# Patient Record
Sex: FEMALE | Race: WHITE | Employment: OTHER | URBAN - METROPOLITAN AREA
[De-identification: names, ages, dates, MRNs, and addresses within clinical notes are randomized per-mention and may not be internally consistent; named-entity substitution may affect disease eponyms.]

---

## 2017-01-11 ENCOUNTER — OFFICE VISIT (OUTPATIENT)
Dept: FAMILY MEDICINE CLINIC | Facility: CLINIC | Age: 77
End: 2017-01-11

## 2017-01-11 VITALS
BODY MASS INDEX: 27 KG/M2 | WEIGHT: 151 LBS | DIASTOLIC BLOOD PRESSURE: 71 MMHG | RESPIRATION RATE: 18 BRPM | SYSTOLIC BLOOD PRESSURE: 126 MMHG | TEMPERATURE: 98 F | HEART RATE: 59 BPM

## 2017-01-11 DIAGNOSIS — F17.200 SMOKING ADDICTION: ICD-10-CM

## 2017-01-11 DIAGNOSIS — K52.838 OTHER MICROSCOPIC COLITIS: Primary | ICD-10-CM

## 2017-01-11 DIAGNOSIS — M48.00 SPINAL STENOSIS, UNSPECIFIED SPINAL REGION: ICD-10-CM

## 2017-01-11 DIAGNOSIS — I10 ESSENTIAL HYPERTENSION: ICD-10-CM

## 2017-01-11 DIAGNOSIS — M79.672 LEFT FOOT PAIN: ICD-10-CM

## 2017-01-11 PROCEDURE — 99213 OFFICE O/P EST LOW 20 MIN: CPT | Performed by: FAMILY MEDICINE

## 2017-01-11 PROCEDURE — G0463 HOSPITAL OUTPT CLINIC VISIT: HCPCS | Performed by: FAMILY MEDICINE

## 2017-01-11 RX ORDER — IRBESARTAN 300 MG/1
TABLET ORAL
Qty: 90 TABLET | Refills: 1 | Status: SHIPPED | OUTPATIENT
Start: 2017-01-11 | End: 2017-07-20

## 2017-01-11 RX ORDER — NIFEDIPINE 60 MG/1
TABLET, FILM COATED, EXTENDED RELEASE ORAL
Qty: 90 TABLET | Refills: 1 | Status: SHIPPED | OUTPATIENT
Start: 2017-01-11 | End: 2017-08-01

## 2017-01-11 RX ORDER — BUDESONIDE 3 MG/1
3 CAPSULE, COATED PELLETS ORAL EVERY MORNING
Qty: 90 CAPSULE | Refills: 0 | Status: SHIPPED | OUTPATIENT
Start: 2017-01-11 | End: 2017-04-23

## 2017-01-11 RX ORDER — SIMVASTATIN 40 MG
20 TABLET ORAL NIGHTLY
Qty: 90 TABLET | Refills: 1 | Status: SHIPPED | OUTPATIENT
Start: 2017-01-11 | End: 2017-07-31

## 2017-01-11 RX ORDER — TRAMADOL HYDROCHLORIDE 50 MG/1
TABLET ORAL
Qty: 120 TABLET | Refills: 0 | Status: SHIPPED
Start: 2017-01-11 | End: 2017-04-24

## 2017-01-11 RX ORDER — TIZANIDINE 2 MG/1
2 TABLET ORAL NIGHTLY
Qty: 90 TABLET | Refills: 1 | Status: SHIPPED | OUTPATIENT
Start: 2017-01-11 | End: 2019-08-12

## 2017-01-11 RX ORDER — OMEPRAZOLE 20 MG/1
CAPSULE, DELAYED RELEASE ORAL
Qty: 90 CAPSULE | Refills: 1 | Status: SHIPPED | OUTPATIENT
Start: 2017-01-11 | End: 2017-07-08

## 2017-01-11 RX ORDER — CHLORTHALIDONE 25 MG/1
TABLET ORAL
Qty: 90 TABLET | Refills: 1 | Status: SHIPPED | OUTPATIENT
Start: 2017-01-11 | End: 2017-07-08

## 2017-01-11 NOTE — PROGRESS NOTES
HPI:  Oj Corcoran is a 77year old female who presents for follow-up visit. Transferring over from [facility] has a history of microscopic colitis. Pt was told she was due to take it for one year and then can stop.   States she is no longer having the proble prior to visit. Tobacco Use: yes     ROS: see HPI    Objective:   Gen: AOx3. NAD.    /71 mmHg  Pulse 59  Temp(Src) 97.9 °F (36.6 °C) (Oral)  Resp 18  Wt 151 lb (68.493 kg)   CV:  Regular rate and rhythm; no murmurs  Lungs:  Clear to ausculation; goo

## 2017-01-23 ENCOUNTER — OFFICE VISIT (OUTPATIENT)
Dept: PODIATRY CLINIC | Facility: CLINIC | Age: 77
End: 2017-01-23

## 2017-01-23 DIAGNOSIS — M79.675 PAIN OF TOE OF LEFT FOOT: Primary | ICD-10-CM

## 2017-01-23 DIAGNOSIS — M20.42 HAMMER TOE OF LEFT FOOT: ICD-10-CM

## 2017-01-23 PROCEDURE — 99203 OFFICE O/P NEW LOW 30 MIN: CPT | Performed by: PODIATRIST

## 2017-01-23 PROCEDURE — G0463 HOSPITAL OUTPT CLINIC VISIT: HCPCS | Performed by: PODIATRIST

## 2017-01-23 RX ORDER — OMEGA-3 FATTY ACIDS/FISH OIL 300-1000MG
CAPSULE ORAL
COMMUNITY
End: 2019-08-12

## 2017-01-23 RX ORDER — MULTIVIT WITH MINERALS/LUTEIN
1000 TABLET ORAL DAILY
COMMUNITY
End: 2019-08-12

## 2017-01-23 RX ORDER — ACETAMINOPHEN 160 MG
2000 TABLET,DISINTEGRATING ORAL DAILY
COMMUNITY
End: 2019-08-12

## 2017-01-23 NOTE — PROGRESS NOTES
HPI:    Patient ID: Rut Newell is a 77year old female. HPI  This pleasant 78-year-old female presents as a new patient to me on referral from [address] [place] [address]. Patient is accompanied today by her .   Her chief complaint is pain associated with the Allergies:  Seasonal                   PHYSICAL EXAM:   Physical Exam  On physical exam it is quite obvious in reference to her area of pain.   The lesser toes on this left foot are semi-rigidly contracted with dorsal keratoses over the proximal interph

## 2017-04-24 RX ORDER — TRAMADOL HYDROCHLORIDE 50 MG/1
TABLET ORAL
Qty: 120 TABLET | Refills: 0 | Status: SHIPPED | OUTPATIENT
Start: 2017-04-24 | End: 2017-08-01

## 2017-04-24 RX ORDER — BUDESONIDE 3 MG/1
CAPSULE, COATED PELLETS ORAL
Qty: 90 CAPSULE | Refills: 0 | Status: SHIPPED | OUTPATIENT
Start: 2017-04-24 | End: 2017-07-24

## 2017-04-24 NOTE — TELEPHONE ENCOUNTER
PLEASE ADVISE ON REFILL. THANKS.   Recent Visits       Provider Department Primary Dx    3 months ago Dany Wild MD [facility], [facility], [address], [facility] Other microscopic colitis    5 months ago Dany Wild MD [facility], [facility], [address],

## 2017-04-24 NOTE — TELEPHONE ENCOUNTER
Requesting Tramadol refill    Last filled 1/11/17  Last OV 1/11/17    Med pended for review, please advise

## 2017-05-31 ENCOUNTER — OFFICE VISIT (OUTPATIENT)
Dept: FAMILY MEDICINE CLINIC | Facility: CLINIC | Age: 77
End: 2017-05-31

## 2017-05-31 VITALS
RESPIRATION RATE: 18 BRPM | WEIGHT: 149 LBS | BODY MASS INDEX: 26 KG/M2 | SYSTOLIC BLOOD PRESSURE: 190 MMHG | HEART RATE: 62 BPM | DIASTOLIC BLOOD PRESSURE: 83 MMHG | TEMPERATURE: 98 F

## 2017-05-31 DIAGNOSIS — M79.672 LEFT FOOT PAIN: ICD-10-CM

## 2017-05-31 DIAGNOSIS — L02.31 CUTANEOUS ABSCESS OF BUTTOCK: ICD-10-CM

## 2017-05-31 DIAGNOSIS — K62.5 RECTAL BLEEDING: ICD-10-CM

## 2017-05-31 DIAGNOSIS — K52.838 OTHER MICROSCOPIC COLITIS: Primary | ICD-10-CM

## 2017-05-31 PROCEDURE — 10060 I&D ABSCESS SIMPLE/SINGLE: CPT | Performed by: FAMILY MEDICINE

## 2017-05-31 PROCEDURE — G0463 HOSPITAL OUTPT CLINIC VISIT: HCPCS | Performed by: FAMILY MEDICINE

## 2017-05-31 PROCEDURE — 99214 OFFICE O/P EST MOD 30 MIN: CPT | Performed by: FAMILY MEDICINE

## 2017-05-31 RX ORDER — CLINDAMYCIN HYDROCHLORIDE 300 MG/1
300 CAPSULE ORAL 3 TIMES DAILY
Qty: 21 CAPSULE | Refills: 0 | Status: SHIPPED | OUTPATIENT
Start: 2017-05-31 | End: 2017-06-07

## 2017-05-31 NOTE — PROGRESS NOTES
HPI: Aung Garcia is a 77year old female who presents for bright red blood after bowel movements for the past week. Pt states she has trouble with bowel habits. She goes between constipation and diarrhea. Bleeding is new.  Started to get worse after difficult by mouth nightly. Disp: 90 tablet Rfl: 1   Irbesartan 300 MG Oral Tab TK 1 T PO QD Disp: 90 tablet Rfl: 1     No current facility-administered medications on file prior to visit. Tobacco Use: no    Objective:   Gen: AOx3. In discomfort.    /79 mmHg

## 2017-06-03 ENCOUNTER — OFFICE VISIT (OUTPATIENT)
Dept: FAMILY MEDICINE CLINIC | Facility: CLINIC | Age: 77
End: 2017-06-03

## 2017-06-03 VITALS
DIASTOLIC BLOOD PRESSURE: 77 MMHG | WEIGHT: 147.81 LBS | HEIGHT: 63 IN | HEART RATE: 57 BPM | RESPIRATION RATE: 17 BRPM | BODY MASS INDEX: 26.19 KG/M2 | SYSTOLIC BLOOD PRESSURE: 153 MMHG | TEMPERATURE: 98 F

## 2017-06-03 DIAGNOSIS — L02.31 ABSCESS, GLUTEAL, RIGHT: Primary | ICD-10-CM

## 2017-06-03 PROCEDURE — 99213 OFFICE O/P EST LOW 20 MIN: CPT | Performed by: FAMILY MEDICINE

## 2017-06-03 PROCEDURE — G0463 HOSPITAL OUTPT CLINIC VISIT: HCPCS | Performed by: FAMILY MEDICINE

## 2017-06-03 NOTE — PROGRESS NOTES
HPI: Lary Richter is a 77year old female who presents for follow-up right gluteal abscess. Packing fell out on Thursday night. Pain has improved. Taking Clindamycin. Not draining any longer. No fever.      PMH:    Past Medical History   Diagnosis Date   • E m)  Wt 147 lb 12.8 oz (67.042 kg)  BMI 26.19 kg/m2  Right gluteal abscess- improved. Erythema improved. Incision closed. Decreased induration. Mildly tender to palpation.       Assessment:/Plan:  Abscess, gluteal, right  (primary encounter diagnosis)

## 2017-06-06 ENCOUNTER — OFFICE VISIT (OUTPATIENT)
Dept: PODIATRY CLINIC | Facility: CLINIC | Age: 77
End: 2017-06-06

## 2017-06-06 DIAGNOSIS — M20.42 HAMMER TOE OF LEFT FOOT: ICD-10-CM

## 2017-06-06 DIAGNOSIS — M79.675 PAIN OF TOE OF LEFT FOOT: Primary | ICD-10-CM

## 2017-06-06 PROCEDURE — G0463 HOSPITAL OUTPT CLINIC VISIT: HCPCS | Performed by: PODIATRIST

## 2017-06-06 PROCEDURE — 99212 OFFICE O/P EST SF 10 MIN: CPT | Performed by: PODIATRIST

## 2017-06-07 NOTE — PROGRESS NOTES
HPI:    Patient ID: Noemí Gallego is a 77year old female. HPI  This 45-year-old female presents to me today with a chief complaint of pain associated with the fifth left toe. Patient states that she cannot even wear shoe on this toe today.   The pain excisional debridement of the area demonstrates an area of chronic nucleation. Upon debridement I anticipate immediate and complete relief but as well based on digital deformity I would expect recurrence.   I again spent time discussing the etiology, progr

## 2017-07-13 RX ORDER — OMEPRAZOLE 20 MG/1
CAPSULE, DELAYED RELEASE ORAL
Qty: 90 CAPSULE | Refills: 0 | Status: SHIPPED | OUTPATIENT
Start: 2017-07-13 | End: 2017-10-11

## 2017-07-13 RX ORDER — CHLORTHALIDONE 25 MG/1
TABLET ORAL
Qty: 90 TABLET | Refills: 0 | Status: SHIPPED | OUTPATIENT
Start: 2017-07-13 | End: 2017-10-13

## 2017-07-13 NOTE — TELEPHONE ENCOUNTER
Hypertensive Medications  Protocol Criteria:  · Appointment scheduled in the past 6 months or in the next 3 months  · BMP or CMP in the past 12 months  · Creatinine result < 2  Recent Outpatient Visits            1 month ago Pain of toe of left foot    [place]

## 2017-07-24 ENCOUNTER — TELEPHONE (OUTPATIENT)
Dept: FAMILY MEDICINE CLINIC | Facility: CLINIC | Age: 77
End: 2017-07-24

## 2017-07-24 RX ORDER — BUDESONIDE 3 MG/1
CAPSULE, COATED PELLETS ORAL
Qty: 90 CAPSULE | Refills: 0 | Status: SHIPPED | OUTPATIENT
Start: 2017-07-24 | End: 2017-12-20

## 2017-07-24 RX ORDER — IRBESARTAN 300 MG/1
TABLET ORAL
Qty: 90 TABLET | Refills: 0 | Status: SHIPPED | OUTPATIENT
Start: 2017-07-24 | End: 2017-10-25

## 2017-07-24 NOTE — TELEPHONE ENCOUNTER
Signed Prescriptions Disp Refills    IRBESARTAN 300 MG Oral Tab 90 tablet 0      Sig: TAKE 1 TABLET BY MOUTH EVERY DAY        Authorizing Provider: Marla Bolaños        Ordering User: Mamadou Donato           Refill approved per protocol.

## 2017-07-24 NOTE — TELEPHONE ENCOUNTER
Refill Protocol Appointment Criteria  · Appointment scheduled in the past 6 months or in the next 3 months  Recent Outpatient Visits            1 month ago Pain of toe of left foot    [facility], [address], [place], [place] [place]

## 2017-07-24 NOTE — TELEPHONE ENCOUNTER
Pt walk in OPO this am, she thought her appt with Dr Levar Barry was today, but it's next Monday July 31,2017, she stated she only have 2 days left of her medication budesonide 3mg she stated she only need 1 month, and was also asking about her 3 month refill

## 2017-07-27 ENCOUNTER — OFFICE VISIT (OUTPATIENT)
Dept: GASTROENTEROLOGY | Facility: CLINIC | Age: 77
End: 2017-07-27

## 2017-07-27 VITALS
HEART RATE: 59 BPM | DIASTOLIC BLOOD PRESSURE: 73 MMHG | WEIGHT: 149 LBS | SYSTOLIC BLOOD PRESSURE: 163 MMHG | HEIGHT: 62 IN | BODY MASS INDEX: 27.42 KG/M2

## 2017-07-27 DIAGNOSIS — K52.9 CHRONIC DIARRHEA: Primary | ICD-10-CM

## 2017-07-27 DIAGNOSIS — K52.839 MICROSCOPIC COLITIS, UNSPECIFIED MICROSCOPIC COLITIS TYPE: ICD-10-CM

## 2017-07-27 PROCEDURE — 99214 OFFICE O/P EST MOD 30 MIN: CPT | Performed by: INTERNAL MEDICINE

## 2017-07-27 PROCEDURE — G0463 HOSPITAL OUTPT CLINIC VISIT: HCPCS | Performed by: INTERNAL MEDICINE

## 2017-07-27 NOTE — H&P
[address] [facility] [address] Gastroenterology                                                                                                  Clinic History and Physical     Pa @ [address]  2015: EGD      Comment: @ [facility]   No date: TUBAL LIGATION   Family Hx:   Family History   Problem Relation Age of Onset   • Heart Disorder Father 79     MI   • Cancer Father 39     stomach   • Other [OTHER] Father      emphysema   • Other Thuy Rape diplopia   RESPIRATORY:  negative for severe shortness of breath  CARDIOVASCULAR:  negative for crushing sub-sternal chest pain  GASTROINTESTINAL:  see HPI  GENITOURINARY:  negative for dysuria or gross hematuria  INTEGUMENT/BREAST:  SKIN:  negative for ja she may have significant flare of her microscopic colitis. Therefore I would suggest that she try to add Imodium and bismuth in the form of Pepto-Bismol along with budesonide to see if the budesonide can be taken to every other day.   Will review records t

## 2017-07-28 ENCOUNTER — TELEPHONE (OUTPATIENT)
Dept: GASTROENTEROLOGY | Facility: CLINIC | Age: 77
End: 2017-07-28

## 2017-07-29 ENCOUNTER — LAB ENCOUNTER (OUTPATIENT)
Dept: LAB | Age: 77
End: 2017-07-29
Attending: INTERNAL MEDICINE
Payer: MEDICARE

## 2017-07-29 DIAGNOSIS — K52.9 CHRONIC DIARRHEA: ICD-10-CM

## 2017-07-29 LAB
ALBUMIN SERPL BCP-MCNC: 3.8 G/DL (ref 3.5–4.8)
ALBUMIN/GLOB SERPL: 1.3 {RATIO} (ref 1–2)
ALP SERPL-CCNC: 84 U/L (ref 32–100)
ALT SERPL-CCNC: 21 U/L (ref 14–54)
ANION GAP SERPL CALC-SCNC: 9 MMOL/L (ref 0–18)
AST SERPL-CCNC: 27 U/L (ref 15–41)
BASOPHILS # BLD: 0 K/UL (ref 0–0.2)
BASOPHILS NFR BLD: 1 %
BILIRUB SERPL-MCNC: 0.8 MG/DL (ref 0.3–1.2)
BUN SERPL-MCNC: 28 MG/DL (ref 8–20)
BUN/CREAT SERPL: 22.6 (ref 10–20)
CALCIUM SERPL-MCNC: 9.2 MG/DL (ref 8.5–10.5)
CHLORIDE SERPL-SCNC: 103 MMOL/L (ref 95–110)
CO2 SERPL-SCNC: 24 MMOL/L (ref 22–32)
CORTIS SERPL-MCNC: 18 MCG/DL
CREAT SERPL-MCNC: 1.24 MG/DL (ref 0.5–1.5)
EOSINOPHIL # BLD: 0.1 K/UL (ref 0–0.7)
EOSINOPHIL NFR BLD: 2 %
ERYTHROCYTE [DISTWIDTH] IN BLOOD BY AUTOMATED COUNT: 14.9 % (ref 11–15)
GLOBULIN PLAS-MCNC: 3 G/DL (ref 2.5–3.7)
GLUCOSE SERPL-MCNC: 87 MG/DL (ref 70–99)
HCT VFR BLD AUTO: 41.4 % (ref 35–48)
HGB BLD-MCNC: 13.8 G/DL (ref 12–16)
LYMPHOCYTES # BLD: 0.9 K/UL (ref 1–4)
LYMPHOCYTES NFR BLD: 16 %
MAGNESIUM SERPL-MCNC: 1.8 MG/DL (ref 1.8–2.5)
MCH RBC QN AUTO: 30.5 PG (ref 27–32)
MCHC RBC AUTO-ENTMCNC: 33.3 G/DL (ref 32–37)
MCV RBC AUTO: 91.4 FL (ref 80–100)
MONOCYTES # BLD: 0.5 K/UL (ref 0–1)
MONOCYTES NFR BLD: 9 %
NEUTROPHILS # BLD AUTO: 4 K/UL (ref 1.8–7.7)
NEUTROPHILS NFR BLD: 71 %
OSMOLALITY UR CALC.SUM OF ELEC: 287 MOSM/KG (ref 275–295)
PLATELET # BLD AUTO: 175 K/UL (ref 140–400)
PMV BLD AUTO: 8.7 FL (ref 7.4–10.3)
POTASSIUM SERPL-SCNC: 3.8 MMOL/L (ref 3.3–5.1)
PROT SERPL-MCNC: 6.8 G/DL (ref 5.9–8.4)
RBC # BLD AUTO: 4.53 M/UL (ref 3.7–5.4)
SODIUM SERPL-SCNC: 136 MMOL/L (ref 136–144)
TSH SERPL-ACNC: 1.06 UIU/ML (ref 0.45–5.33)
VIT B12 SERPL-MCNC: 238 PG/ML (ref 181–914)
WBC # BLD AUTO: 5.6 K/UL (ref 4–11)

## 2017-07-29 PROCEDURE — 82306 VITAMIN D 25 HYDROXY: CPT

## 2017-07-29 PROCEDURE — 82533 TOTAL CORTISOL: CPT

## 2017-07-29 PROCEDURE — 84443 ASSAY THYROID STIM HORMONE: CPT

## 2017-07-29 PROCEDURE — 36415 COLL VENOUS BLD VENIPUNCTURE: CPT

## 2017-07-29 PROCEDURE — 80053 COMPREHEN METABOLIC PANEL: CPT

## 2017-07-29 PROCEDURE — 83735 ASSAY OF MAGNESIUM: CPT

## 2017-07-29 PROCEDURE — 85025 COMPLETE CBC W/AUTO DIFF WBC: CPT

## 2017-07-29 PROCEDURE — 82607 VITAMIN B-12: CPT

## 2017-07-31 ENCOUNTER — OFFICE VISIT (OUTPATIENT)
Dept: FAMILY MEDICINE CLINIC | Facility: CLINIC | Age: 77
End: 2017-07-31

## 2017-07-31 VITALS
TEMPERATURE: 98 F | DIASTOLIC BLOOD PRESSURE: 84 MMHG | RESPIRATION RATE: 18 BRPM | BODY MASS INDEX: 27.23 KG/M2 | WEIGHT: 148 LBS | HEIGHT: 62 IN | SYSTOLIC BLOOD PRESSURE: 152 MMHG | HEART RATE: 55 BPM

## 2017-07-31 DIAGNOSIS — M19.90 OSTEOARTHRITIS, UNSPECIFIED OSTEOARTHRITIS TYPE, UNSPECIFIED SITE: ICD-10-CM

## 2017-07-31 DIAGNOSIS — K52.838 OTHER MICROSCOPIC COLITIS: ICD-10-CM

## 2017-07-31 DIAGNOSIS — I10 ESSENTIAL HYPERTENSION: Primary | ICD-10-CM

## 2017-07-31 LAB — 25(OH)D3 SERPL-MCNC: 31.3 NG/ML

## 2017-07-31 PROCEDURE — G0463 HOSPITAL OUTPT CLINIC VISIT: HCPCS | Performed by: FAMILY MEDICINE

## 2017-07-31 PROCEDURE — 99214 OFFICE O/P EST MOD 30 MIN: CPT | Performed by: FAMILY MEDICINE

## 2017-07-31 RX ORDER — SIMVASTATIN 40 MG
20 TABLET ORAL NIGHTLY
Qty: 45 TABLET | Refills: 1 | Status: SHIPPED | OUTPATIENT
Start: 2017-07-31

## 2017-07-31 RX ORDER — LOPERAMIDE HYDROCHLORIDE 2 MG/1
2 CAPSULE ORAL EVERY OTHER DAY
COMMUNITY
End: 2019-08-12

## 2017-07-31 NOTE — PROGRESS NOTES
HPI: Jefferson Gill is a 77year old female who presents for follow-up. BP elevated today. States it is because of beng in doctor's office. Takes BP at home and it runs 130-140s/80s. Denies chest pain, SOB, palpitations, edema.         Follows with Dr. Mitchel Briceño total) by mouth nightly. Disp: 90 tablet Rfl: 1   NIFEDICAL XL 60 MG Oral Tablet 24 Hr TK 1 T PO QD Disp: 90 tablet Rfl: 1   simvastatin 40 MG Oral Tab Take 0.5 tablets (20 mg total) by mouth nightly.  Disp: 90 tablet Rfl: 1     No current facility-administ

## 2017-08-02 ENCOUNTER — TELEPHONE (OUTPATIENT)
Dept: GASTROENTEROLOGY | Facility: CLINIC | Age: 77
End: 2017-08-02

## 2017-08-02 NOTE — TELEPHONE ENCOUNTER
D/w Glenda Knight re: her blood tests. Low normal B12, she will supplement with oral B12 vitamins. Vit D appropriate. Cortisol appropriate. Mag appropriate. She can continue omeprazole and budesonide 3mg (3-4x a week for her microscopic colitis).      Cr 1.24, see

## 2017-08-02 NOTE — TELEPHONE ENCOUNTER
Thanks. I did speak with her about the kidney function. Her GFR is low. I am planning on repeating in the future and following up.

## 2017-08-04 RX ORDER — NIFEDIPINE 60 MG/1
TABLET, FILM COATED, EXTENDED RELEASE ORAL
Qty: 90 TABLET | Refills: 0 | Status: SHIPPED | OUTPATIENT
Start: 2017-08-04 | End: 2017-11-03

## 2017-08-05 RX ORDER — TRAMADOL HYDROCHLORIDE 50 MG/1
TABLET ORAL
Qty: 120 TABLET | Refills: 0 | OUTPATIENT
Start: 2017-08-05 | End: 2017-11-03

## 2017-08-05 NOTE — TELEPHONE ENCOUNTER
Refilled per protocol.    Hypertensive Medications  Protocol Criteria:  · Appointment scheduled in the past 6 months or in the next 3 months  · BMP or CMP in the past 12 months  · Creatinine result < 2  Recent Outpatient Visits            4 days ago Daniel

## 2017-08-05 NOTE — TELEPHONE ENCOUNTER
Last refilled tramadol 4-24-17 #120 0RF  Rx needs to be phoned in if approved.     ·   Recent Outpatient Visits            4 days ago Essential hypertension    [facility], [facility], [address], Nellie Mos[place], [address] [facility]    Office Visit    1 week ago Veronica

## 2017-08-08 RX ORDER — TRAMADOL HYDROCHLORIDE 50 MG/1
TABLET ORAL
Qty: 120 TABLET | Refills: 0 | OUTPATIENT
Start: 2017-08-08

## 2017-08-08 NOTE — TELEPHONE ENCOUNTER
Chart reviewed, 8/5/17 tramadol 50 mg script phone in to [facility] at [address] in [facility] ([facility]) per Dr Garett Chavez written orders.

## 2017-09-18 ENCOUNTER — NURSE ONLY (OUTPATIENT)
Dept: FAMILY MEDICINE CLINIC | Facility: CLINIC | Age: 77
End: 2017-09-18

## 2017-09-18 DIAGNOSIS — Z23 NEED FOR INFLUENZA VACCINATION: Primary | ICD-10-CM

## 2017-09-18 PROCEDURE — G0008 ADMIN INFLUENZA VIRUS VAC: HCPCS | Performed by: FAMILY MEDICINE

## 2017-09-18 PROCEDURE — 90653 IIV ADJUVANT VACCINE IM: CPT | Performed by: FAMILY MEDICINE

## 2017-09-20 ENCOUNTER — HOSPITAL ENCOUNTER (OUTPATIENT)
Dept: GENERAL RADIOLOGY | Age: 77
Discharge: HOME | End: 2017-09-20
Attending: PHYSICAL MEDICINE & REHABILITATION
Payer: MEDICARE

## 2017-09-20 ENCOUNTER — OFFICE VISIT (OUTPATIENT)
Dept: NEUROLOGY | Facility: CLINIC | Age: 77
End: 2017-09-20

## 2017-09-20 ENCOUNTER — APPOINTMENT (OUTPATIENT)
Dept: GENERAL RADIOLOGY | Age: 77
End: 2017-09-20
Attending: PHYSICAL MEDICINE & REHABILITATION
Payer: MEDICARE

## 2017-09-20 VITALS
HEIGHT: 62 IN | SYSTOLIC BLOOD PRESSURE: 144 MMHG | RESPIRATION RATE: 18 BRPM | DIASTOLIC BLOOD PRESSURE: 78 MMHG | OXYGEN SATURATION: 94 % | HEART RATE: 56 BPM | WEIGHT: 145 LBS | BODY MASS INDEX: 26.68 KG/M2

## 2017-09-20 DIAGNOSIS — M25.561 CHRONIC PAIN OF BOTH KNEES: Primary | ICD-10-CM

## 2017-09-20 DIAGNOSIS — M54.41 CHRONIC BILATERAL LOW BACK PAIN WITH BILATERAL SCIATICA: ICD-10-CM

## 2017-09-20 DIAGNOSIS — G89.29 CHRONIC PAIN OF BOTH KNEES: ICD-10-CM

## 2017-09-20 DIAGNOSIS — M25.562 CHRONIC PAIN OF BOTH KNEES: ICD-10-CM

## 2017-09-20 DIAGNOSIS — G89.29 CHRONIC BILATERAL LOW BACK PAIN WITH BILATERAL SCIATICA: ICD-10-CM

## 2017-09-20 DIAGNOSIS — G89.29 CHRONIC PAIN OF BOTH KNEES: Primary | ICD-10-CM

## 2017-09-20 DIAGNOSIS — M54.42 CHRONIC BILATERAL LOW BACK PAIN WITH BILATERAL SCIATICA: ICD-10-CM

## 2017-09-20 DIAGNOSIS — M25.562 CHRONIC PAIN OF BOTH KNEES: Primary | ICD-10-CM

## 2017-09-20 DIAGNOSIS — M25.561 CHRONIC PAIN OF BOTH KNEES: ICD-10-CM

## 2017-09-20 PROBLEM — M17.0 PRIMARY OSTEOARTHRITIS OF BOTH KNEES: Status: ACTIVE | Noted: 2017-09-20

## 2017-09-20 PROCEDURE — 99204 OFFICE O/P NEW MOD 45 MIN: CPT | Performed by: PHYSICAL MEDICINE & REHABILITATION

## 2017-09-20 PROCEDURE — 73560 X-RAY EXAM OF KNEE 1 OR 2: CPT | Performed by: PHYSICAL MEDICINE & REHABILITATION

## 2017-09-20 NOTE — PATIENT INSTRUCTIONS
Refill policies:    • Allow 2 business days for refills; controlled substances may take longer.   • Contact your pharmacy at least 5 days prior to running out of medication and have them send an electronic request or submit request through the “request re your physician has recommended that you have a procedure or additional testing performed. [facility] will contact your insurance carrier to obtain pre-certification or prior authorization.     Unfortunately, EM has seen an increas

## 2017-09-20 NOTE — PROGRESS NOTES
Low Back Pain H & P    Chief Complaint:  Patient presents with:  Low Back Pain: NP refferred by Dr Adore Christopher. Pt having right sided back pain going to right hip going down outer aspect of right leg to ankle. Pt has cramping of toes worse on left.  Pt has pain currently  2/10. The pain is described as a(n) tightness sensation. The knee pain is sharp or stabbing when going up or down the stairs. Standing, the knee pain is an aching pain. · The pain is worse walking.     · The pain is better when bending and si night sweats, weight gain and weight loss. ENT:   Negative for hearing loss. Some decrease in smell. Eyes:   Positive for some vision changes. Respiratory:  Negative for dyspnea. Cardio:   Negative for chest pain. GI:   Negative for abdominal pain.  Pebbles Covarrubias Bursa     Vascular lower extremity:   Dorsalis pedis pulse-RIGHT 2+   Dorsalis pedis pulse-LEFT 2+   Tibialis posterior pulse-RIGHT 2+   Tibialis posterior pulse-LEFT 2+      Neurological Lower Extremity:    Light Touch Sensation: Intact in bilateral LE ex the imaging studies and I will review them and my office will get back in touch with the patient with any changes in the plan within 7-10 days. She will continue with the Ultram for the pain. She will need PT in the future and possibly injections.

## 2017-09-22 ENCOUNTER — HOSPITAL ENCOUNTER (OUTPATIENT)
Dept: CT IMAGING | Facility: HOSPITAL | Age: 77
Discharge: HOME | End: 2017-09-22
Attending: PHYSICAL MEDICINE & REHABILITATION
Payer: MEDICARE

## 2017-09-22 DIAGNOSIS — G89.29 CHRONIC BILATERAL LOW BACK PAIN WITH BILATERAL SCIATICA: ICD-10-CM

## 2017-09-22 DIAGNOSIS — M54.41 CHRONIC BILATERAL LOW BACK PAIN WITH BILATERAL SCIATICA: ICD-10-CM

## 2017-09-22 DIAGNOSIS — M54.42 CHRONIC BILATERAL LOW BACK PAIN WITH BILATERAL SCIATICA: ICD-10-CM

## 2017-09-22 PROCEDURE — 72131 CT LUMBAR SPINE W/O DYE: CPT | Performed by: PHYSICAL MEDICINE & REHABILITATION

## 2017-09-26 ENCOUNTER — TELEPHONE (OUTPATIENT)
Dept: NEUROLOGY | Facility: CLINIC | Age: 77
End: 2017-09-26

## 2017-09-26 DIAGNOSIS — M51.9 LUMBAR DISC DISEASE: ICD-10-CM

## 2017-09-26 DIAGNOSIS — M54.41 CHRONIC BILATERAL LOW BACK PAIN WITH BILATERAL SCIATICA: Primary | ICD-10-CM

## 2017-09-26 DIAGNOSIS — M25.561 CHRONIC PAIN OF BOTH KNEES: ICD-10-CM

## 2017-09-26 DIAGNOSIS — M25.562 CHRONIC PAIN OF BOTH KNEES: ICD-10-CM

## 2017-09-26 DIAGNOSIS — M43.16 SPONDYLOLISTHESIS OF LUMBAR REGION: ICD-10-CM

## 2017-09-26 DIAGNOSIS — M54.42 CHRONIC BILATERAL LOW BACK PAIN WITH BILATERAL SCIATICA: Primary | ICD-10-CM

## 2017-09-26 DIAGNOSIS — M48.061 LUMBAR FORAMINAL STENOSIS: ICD-10-CM

## 2017-09-26 DIAGNOSIS — M17.0 PRIMARY OSTEOARTHRITIS OF BOTH KNEES: ICD-10-CM

## 2017-09-26 DIAGNOSIS — M48.061 LUMBAR CANAL STENOSIS: ICD-10-CM

## 2017-09-26 DIAGNOSIS — M43.10 RETROLISTHESIS: ICD-10-CM

## 2017-09-26 DIAGNOSIS — G89.29 CHRONIC PAIN OF BOTH KNEES: ICD-10-CM

## 2017-09-26 DIAGNOSIS — G89.29 CHRONIC BILATERAL LOW BACK PAIN WITH BILATERAL SCIATICA: Primary | ICD-10-CM

## 2017-09-26 NOTE — TELEPHONE ENCOUNTER
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  I would like to see the CT scan results before getting her start with the PT.

## 2017-09-26 NOTE — TELEPHONE ENCOUNTER
I reviewed her CT scan and she has severe spinal stenosis. I would like for her to start PT on the knees and the lumbar spine. If the PT is not helping her after 4-6 sessions, then I will do injections.   If it is too painful for her to do the PT, then sh

## 2017-09-28 ENCOUNTER — TELEPHONE (OUTPATIENT)
Dept: NEUROLOGY | Facility: CLINIC | Age: 77
End: 2017-09-28

## 2017-09-28 NOTE — TELEPHONE ENCOUNTER
Received in basket from [email or web address] advising of approval for Physical therapy. Will call Pt. to inform. Pt. informed 8 PT sessions were approved. Can proceed with scheduling appt.

## 2017-10-09 ENCOUNTER — TELEPHONE (OUTPATIENT)
Dept: FAMILY MEDICINE CLINIC | Facility: CLINIC | Age: 77
End: 2017-10-09

## 2017-10-09 DIAGNOSIS — K21.9 GASTROESOPHAGEAL REFLUX DISEASE, ESOPHAGITIS PRESENCE NOT SPECIFIED: Primary | ICD-10-CM

## 2017-10-11 RX ORDER — OMEPRAZOLE 20 MG/1
20 CAPSULE, DELAYED RELEASE ORAL
Qty: 90 CAPSULE | Refills: 0 | Status: SHIPPED | OUTPATIENT
Start: 2017-10-11

## 2017-10-11 NOTE — TELEPHONE ENCOUNTER
Refill Protocol Appointment Criteria  · Appointment scheduled in the past 12 months or in the next 3 months  Recent Outpatient Visits            3 weeks ago Chronic pain of both [address] [facility], [facility], [place]

## 2017-10-12 ENCOUNTER — MED REC SCAN ONLY (OUTPATIENT)
Dept: NEUROLOGY | Facility: CLINIC | Age: 77
End: 2017-10-12

## 2017-10-13 RX ORDER — CHLORTHALIDONE 25 MG/1
25 TABLET ORAL
Qty: 90 TABLET | Refills: 0 | Status: SHIPPED | OUTPATIENT
Start: 2017-10-13

## 2017-10-13 NOTE — TELEPHONE ENCOUNTER
Pt. states that the Pharmacy only received Omeprazole med., pt. still needs Rx for Chlorthaliadone 25mg. Pt. Is requesting to get a call back today to from RN to find out why 2nd med was not sent in? She states that she is down to her last 2 pills.

## 2017-10-13 NOTE — TELEPHONE ENCOUNTER
Refilled per protocol.   Hypertensive Medications  Protocol Criteria:  · Appointment scheduled in the past 6 months or in the next 3 months  · BMP or CMP in the past 12 months  · Creatinine result < 2  Recent Outpatient Visits            3 weeks ago Chron

## 2017-10-13 NOTE — TELEPHONE ENCOUNTER
Requesting Chlorthalidone refill    Prescription refilled per IM/FM refill protocol, notified pt.        Hypertensive Medications  Protocol Criteria:  · Appointment scheduled in the past 6 months or in the next 3 months  · BMP or CMP in the past 12 months

## 2017-10-24 NOTE — TELEPHONE ENCOUNTER
I called [facility] and spoke to Carol Verma, in medical records  confirming that we had the correct fax number.  Sarah asked that I call after 30 min to see if we received the request.

## 2017-10-25 ENCOUNTER — TELEPHONE (OUTPATIENT)
Dept: FAMILY MEDICINE CLINIC | Facility: CLINIC | Age: 77
End: 2017-10-25

## 2017-10-25 RX ORDER — IRBESARTAN 300 MG/1
300 TABLET ORAL
Qty: 90 TABLET | Refills: 0 | Status: SHIPPED | OUTPATIENT
Start: 2017-10-25

## 2017-10-25 NOTE — TELEPHONE ENCOUNTER
Pt calling and stts she needs a refill on the med below. Pt has one pill left, and called it in to her pharmacy last week, No request received. Please advise.     Medication Quantity Refills Start End   IRBESARTAN 300 MG Oral Tab 90 tablet 0 7/24/2017    Si

## 2017-10-25 NOTE — TELEPHONE ENCOUNTER
Refilled per written protocol.     Hypertensive Medications  Protocol Criteria:  · Appointment scheduled in the past 6 months or in the next 3 months  · BMP or CMP in the past 12 months  · Creatinine result < 2  Recent Outpatient Visits            1 month a

## 2017-10-25 NOTE — TELEPHONE ENCOUNTER
Received records from [facility] today from July.     Placed on [place] desk to review    Colon & EGD with pathology from 12/17/15

## 2017-10-26 ENCOUNTER — TELEPHONE (OUTPATIENT)
Dept: GASTROENTEROLOGY | Facility: CLINIC | Age: 77
End: 2017-10-26

## 2017-10-26 NOTE — TELEPHONE ENCOUNTER
Records received from [facility] office, left on your desk for your review, thank you.

## 2017-10-27 NOTE — TELEPHONE ENCOUNTER
OSH RECORDS:    -12/2015 CLN   -internal hemorrhoids   -sigmoid diverticulosis   -excellent prep   -reached cecum   -random biopsies   -PATH: chronic colitis with increase in lymphocytes, ILEUM wnl  -12/2015 EGD   -hiatal hernia   -gastritis    -irregular

## 2017-11-03 RX ORDER — NIFEDIPINE 60 MG/1
60 TABLET, FILM COATED, EXTENDED RELEASE ORAL
Qty: 90 TABLET | Refills: 0 | Status: SHIPPED | OUTPATIENT
Start: 2017-11-03

## 2017-11-03 NOTE — TELEPHONE ENCOUNTER
Refill protocol criteria met, rx sent. CMW please advise on refill controlled substance. Pt also expresses frustration with refill process. RN attempted to explain refills are handled as the requests are made by pharmacy.   Was not interested in hearing

## 2017-11-03 NOTE — TELEPHONE ENCOUNTER
Per pt, she needs refill on her NIFEDICAL XL and TRAMADOL HCL pt  needs it asap. Due to she is out of it.       Current Outpatient Prescriptions:                    TRAMADOL HCL 50 MG Oral Tab TAKE 1 TABLET BY MOUTH EVERY 6 HOURS AS NEEDED Disp: 120 tablet

## 2017-11-05 RX ORDER — TRAMADOL HYDROCHLORIDE 50 MG/1
TABLET ORAL
Qty: 120 TABLET | Refills: 0 | OUTPATIENT
Start: 2017-11-05 | End: 2017-11-09

## 2017-11-09 RX ORDER — TRAMADOL HYDROCHLORIDE 50 MG/1
TABLET ORAL
Qty: 120 TABLET | Refills: 1 | Status: SHIPPED
Start: 2017-11-09 | End: 2019-08-12

## 2017-12-19 NOTE — TELEPHONE ENCOUNTER
Dorys BARRY needs a refill of:    •  Budesonide 3 MG Oral Cap DR Particles, TAKE ONE CAPSULE BY MOUTH EVERY MORNING, Disp: 90 capsule, Rfl: 0

## 2017-12-21 RX ORDER — BUDESONIDE 3 MG/1
CAPSULE, COATED PELLETS ORAL
Qty: 90 CAPSULE | Refills: 0 | Status: SHIPPED | OUTPATIENT
Start: 2017-12-21 | End: 2019-08-12

## 2017-12-21 NOTE — TELEPHONE ENCOUNTER
Please review and advise    Refill Protocol Appointment Criteria  · Appointment scheduled in the past 6 months or in the next 3 months  Recent Outpatient Visits            3 months ago Chronic pain of both knees    [facility]

## 2018-01-19 ENCOUNTER — TELEPHONE (OUTPATIENT)
Dept: INTERNAL MEDICINE CLINIC | Facility: CLINIC | Age: 78
End: 2018-01-19

## 2018-01-19 NOTE — TELEPHONE ENCOUNTER
Patient is eligible for a 2018 Annual Medicare Health Assessment. Pt states that she has had an insurance change and can no longer go to [facility], [facility].

## 2019-08-12 RX ORDER — METOPROLOL SUCCINATE 50 MG/1
50 TABLET, EXTENDED RELEASE ORAL NIGHTLY
COMMUNITY

## 2019-08-12 RX ORDER — POLYETHYLENE GLYCOL 3350 17 G/17G
17 POWDER, FOR SOLUTION ORAL
COMMUNITY

## 2019-08-13 ENCOUNTER — HOSPITAL ENCOUNTER (INPATIENT)
Facility: HOSPITAL | Age: 79
LOS: 2 days | Discharge: HOME HEALTH | DRG: 517 | End: 2019-08-15
Attending: ORTHOPAEDIC SURGERY | Admitting: ORTHOPAEDIC SURGERY
Payer: MEDICARE

## 2019-08-13 ENCOUNTER — APPOINTMENT (OUTPATIENT)
Dept: GENERAL RADIOLOGY | Facility: HOSPITAL | Age: 79
DRG: 517 | End: 2019-08-13
Attending: ORTHOPAEDIC SURGERY
Payer: MEDICARE

## 2019-08-13 ENCOUNTER — ANESTHESIA EVENT (OUTPATIENT)
Dept: SURGERY | Facility: HOSPITAL | Age: 79
DRG: 517 | End: 2019-08-13
Payer: MEDICARE

## 2019-08-13 ENCOUNTER — ANESTHESIA (OUTPATIENT)
Dept: SURGERY | Facility: HOSPITAL | Age: 79
DRG: 517 | End: 2019-08-13
Payer: MEDICARE

## 2019-08-13 PROBLEM — M48.062 NEUROGENIC CLAUDICATION DUE TO LUMBAR SPINAL STENOSIS: Status: ACTIVE | Noted: 2019-08-13

## 2019-08-13 PROBLEM — M48.00 SPINAL STENOSIS: Status: RESOLVED | Noted: 2019-08-13 | Resolved: 2019-08-13

## 2019-08-13 PROBLEM — E78.5 HYPERLIPIDEMIA: Chronic | Status: ACTIVE | Noted: 2019-08-13

## 2019-08-13 PROBLEM — M48.00 SPINAL STENOSIS: Status: ACTIVE | Noted: 2019-08-13

## 2019-08-13 PROCEDURE — 01NB0ZZ RELEASE LUMBAR NERVE, OPEN APPROACH: ICD-10-PCS | Performed by: ORTHOPAEDIC SURGERY

## 2019-08-13 PROCEDURE — 01NR0ZZ RELEASE SACRAL NERVE, OPEN APPROACH: ICD-10-PCS | Performed by: ORTHOPAEDIC SURGERY

## 2019-08-13 PROCEDURE — 99232 SBSQ HOSP IP/OBS MODERATE 35: CPT | Performed by: HOSPITALIST

## 2019-08-13 PROCEDURE — 76000 FLUOROSCOPY <1 HR PHYS/QHP: CPT | Performed by: ORTHOPAEDIC SURGERY

## 2019-08-13 RX ORDER — LOSARTAN POTASSIUM 100 MG/1
100 TABLET ORAL DAILY
Status: DISCONTINUED | OUTPATIENT
Start: 2019-08-14 | End: 2019-08-15

## 2019-08-13 RX ORDER — HYDROCODONE BITARTRATE AND ACETAMINOPHEN 5; 325 MG/1; MG/1
1 TABLET ORAL EVERY 4 HOURS PRN
Status: DISCONTINUED | OUTPATIENT
Start: 2019-08-13 | End: 2019-08-15

## 2019-08-13 RX ORDER — HYDROMORPHONE HYDROCHLORIDE 1 MG/ML
0.2 INJECTION, SOLUTION INTRAMUSCULAR; INTRAVENOUS; SUBCUTANEOUS EVERY 5 MIN PRN
Status: DISCONTINUED | OUTPATIENT
Start: 2019-08-13 | End: 2019-08-13 | Stop reason: HOSPADM

## 2019-08-13 RX ORDER — NIFEDIPINE 60 MG/1
60 TABLET, EXTENDED RELEASE ORAL NIGHTLY
Status: DISCONTINUED | OUTPATIENT
Start: 2019-08-13 | End: 2019-08-14

## 2019-08-13 RX ORDER — ACETAMINOPHEN 325 MG/1
650 TABLET ORAL EVERY 4 HOURS PRN
Status: DISCONTINUED | OUTPATIENT
Start: 2019-08-13 | End: 2019-08-15

## 2019-08-13 RX ORDER — CYCLOBENZAPRINE HCL 5 MG
5 TABLET ORAL 3 TIMES DAILY PRN
Status: DISCONTINUED | OUTPATIENT
Start: 2019-08-13 | End: 2019-08-15

## 2019-08-13 RX ORDER — HYDROMORPHONE HYDROCHLORIDE 1 MG/ML
0.6 INJECTION, SOLUTION INTRAMUSCULAR; INTRAVENOUS; SUBCUTANEOUS EVERY 5 MIN PRN
Status: DISCONTINUED | OUTPATIENT
Start: 2019-08-13 | End: 2019-08-13 | Stop reason: HOSPADM

## 2019-08-13 RX ORDER — ROCURONIUM BROMIDE 10 MG/ML
INJECTION, SOLUTION INTRAVENOUS AS NEEDED
Status: DISCONTINUED | OUTPATIENT
Start: 2019-08-13 | End: 2019-08-13 | Stop reason: SURG

## 2019-08-13 RX ORDER — DEXAMETHASONE SODIUM PHOSPHATE 4 MG/ML
VIAL (ML) INJECTION AS NEEDED
Status: DISCONTINUED | OUTPATIENT
Start: 2019-08-13 | End: 2019-08-13 | Stop reason: SURG

## 2019-08-13 RX ORDER — METOPROLOL TARTRATE 5 MG/5ML
2.5 INJECTION INTRAVENOUS ONCE
Status: DISCONTINUED | OUTPATIENT
Start: 2019-08-13 | End: 2019-08-13 | Stop reason: HOSPADM

## 2019-08-13 RX ORDER — MORPHINE SULFATE 4 MG/ML
4 INJECTION, SOLUTION INTRAMUSCULAR; INTRAVENOUS EVERY 10 MIN PRN
Status: DISCONTINUED | OUTPATIENT
Start: 2019-08-13 | End: 2019-08-13 | Stop reason: HOSPADM

## 2019-08-13 RX ORDER — SODIUM CHLORIDE, SODIUM LACTATE, POTASSIUM CHLORIDE, CALCIUM CHLORIDE 600; 310; 30; 20 MG/100ML; MG/100ML; MG/100ML; MG/100ML
INJECTION, SOLUTION INTRAVENOUS CONTINUOUS
Status: DISCONTINUED | OUTPATIENT
Start: 2019-08-13 | End: 2019-08-15

## 2019-08-13 RX ORDER — CHLORTHALIDONE 25 MG/1
25 TABLET ORAL
Status: DISCONTINUED | OUTPATIENT
Start: 2019-08-14 | End: 2019-08-15

## 2019-08-13 RX ORDER — NALOXONE HYDROCHLORIDE 0.4 MG/ML
80 INJECTION, SOLUTION INTRAMUSCULAR; INTRAVENOUS; SUBCUTANEOUS AS NEEDED
Status: DISCONTINUED | OUTPATIENT
Start: 2019-08-13 | End: 2019-08-13 | Stop reason: HOSPADM

## 2019-08-13 RX ORDER — PANTOPRAZOLE SODIUM 20 MG/1
20 TABLET, DELAYED RELEASE ORAL
Status: DISCONTINUED | OUTPATIENT
Start: 2019-08-14 | End: 2019-08-15

## 2019-08-13 RX ORDER — DIPHENHYDRAMINE HYDROCHLORIDE 50 MG/ML
25 INJECTION INTRAMUSCULAR; INTRAVENOUS EVERY 4 HOURS PRN
Status: DISCONTINUED | OUTPATIENT
Start: 2019-08-13 | End: 2019-08-15

## 2019-08-13 RX ORDER — HYDROMORPHONE HYDROCHLORIDE 1 MG/ML
0.4 INJECTION, SOLUTION INTRAMUSCULAR; INTRAVENOUS; SUBCUTANEOUS EVERY 5 MIN PRN
Status: DISCONTINUED | OUTPATIENT
Start: 2019-08-13 | End: 2019-08-13 | Stop reason: HOSPADM

## 2019-08-13 RX ORDER — VANCOMYCIN HYDROCHLORIDE 1 G/20ML
INJECTION, POWDER, LYOPHILIZED, FOR SOLUTION INTRAVENOUS AS NEEDED
Status: DISCONTINUED | OUTPATIENT
Start: 2019-08-13 | End: 2019-08-13 | Stop reason: HOSPADM

## 2019-08-13 RX ORDER — NEOSTIGMINE METHYLSULFATE 0.5 MG/ML
INJECTION INTRAVENOUS AS NEEDED
Status: DISCONTINUED | OUTPATIENT
Start: 2019-08-13 | End: 2019-08-13 | Stop reason: SURG

## 2019-08-13 RX ORDER — HYDROCODONE BITARTRATE AND ACETAMINOPHEN 5; 325 MG/1; MG/1
1 TABLET ORAL AS NEEDED
Status: DISCONTINUED | OUTPATIENT
Start: 2019-08-13 | End: 2019-08-13 | Stop reason: HOSPADM

## 2019-08-13 RX ORDER — HYDROCODONE BITARTRATE AND ACETAMINOPHEN 5; 325 MG/1; MG/1
2 TABLET ORAL EVERY 4 HOURS PRN
Status: DISCONTINUED | OUTPATIENT
Start: 2019-08-13 | End: 2019-08-15

## 2019-08-13 RX ORDER — METOCLOPRAMIDE 10 MG/1
10 TABLET ORAL ONCE
Status: DISCONTINUED | OUTPATIENT
Start: 2019-08-13 | End: 2019-08-13 | Stop reason: HOSPADM

## 2019-08-13 RX ORDER — CEFAZOLIN SODIUM/WATER 2 G/20 ML
2 SYRINGE (ML) INTRAVENOUS EVERY 8 HOURS
Status: COMPLETED | OUTPATIENT
Start: 2019-08-13 | End: 2019-08-14

## 2019-08-13 RX ORDER — GLYCOPYRROLATE 0.2 MG/ML
INJECTION INTRAMUSCULAR; INTRAVENOUS AS NEEDED
Status: DISCONTINUED | OUTPATIENT
Start: 2019-08-13 | End: 2019-08-13 | Stop reason: SURG

## 2019-08-13 RX ORDER — BUPIVACAINE HYDROCHLORIDE AND EPINEPHRINE 5; 5 MG/ML; UG/ML
INJECTION, SOLUTION PERINEURAL AS NEEDED
Status: DISCONTINUED | OUTPATIENT
Start: 2019-08-13 | End: 2019-08-13 | Stop reason: HOSPADM

## 2019-08-13 RX ORDER — MIDAZOLAM HYDROCHLORIDE 1 MG/ML
INJECTION INTRAMUSCULAR; INTRAVENOUS AS NEEDED
Status: DISCONTINUED | OUTPATIENT
Start: 2019-08-13 | End: 2019-08-13 | Stop reason: SURG

## 2019-08-13 RX ORDER — LIDOCAINE HYDROCHLORIDE 10 MG/ML
INJECTION, SOLUTION EPIDURAL; INFILTRATION; INTRACAUDAL; PERINEURAL AS NEEDED
Status: DISCONTINUED | OUTPATIENT
Start: 2019-08-13 | End: 2019-08-13 | Stop reason: SURG

## 2019-08-13 RX ORDER — ONDANSETRON 2 MG/ML
INJECTION INTRAMUSCULAR; INTRAVENOUS AS NEEDED
Status: DISCONTINUED | OUTPATIENT
Start: 2019-08-13 | End: 2019-08-13 | Stop reason: SURG

## 2019-08-13 RX ORDER — ACETAMINOPHEN 500 MG
1000 TABLET ORAL ONCE
Status: COMPLETED | OUTPATIENT
Start: 2019-08-13 | End: 2019-08-13

## 2019-08-13 RX ORDER — HYDRALAZINE HYDROCHLORIDE 20 MG/ML
10 INJECTION INTRAMUSCULAR; INTRAVENOUS EVERY 6 HOURS PRN
Status: DISCONTINUED | OUTPATIENT
Start: 2019-08-13 | End: 2019-08-15

## 2019-08-13 RX ORDER — HALOPERIDOL 5 MG/ML
0.25 INJECTION INTRAMUSCULAR ONCE AS NEEDED
Status: DISCONTINUED | OUTPATIENT
Start: 2019-08-13 | End: 2019-08-13 | Stop reason: HOSPADM

## 2019-08-13 RX ORDER — CEFAZOLIN SODIUM/WATER 2 G/20 ML
2 SYRINGE (ML) INTRAVENOUS ONCE
Status: COMPLETED | OUTPATIENT
Start: 2019-08-13 | End: 2019-08-13

## 2019-08-13 RX ORDER — ONDANSETRON 2 MG/ML
4 INJECTION INTRAMUSCULAR; INTRAVENOUS ONCE AS NEEDED
Status: COMPLETED | OUTPATIENT
Start: 2019-08-13 | End: 2019-08-13

## 2019-08-13 RX ORDER — METOCLOPRAMIDE HYDROCHLORIDE 5 MG/ML
10 INJECTION INTRAMUSCULAR; INTRAVENOUS EVERY 6 HOURS PRN
Status: DISCONTINUED | OUTPATIENT
Start: 2019-08-13 | End: 2019-08-15

## 2019-08-13 RX ORDER — MORPHINE SULFATE 10 MG/ML
6 INJECTION, SOLUTION INTRAMUSCULAR; INTRAVENOUS EVERY 10 MIN PRN
Status: DISCONTINUED | OUTPATIENT
Start: 2019-08-13 | End: 2019-08-13 | Stop reason: HOSPADM

## 2019-08-13 RX ORDER — ONDANSETRON 2 MG/ML
4 INJECTION INTRAMUSCULAR; INTRAVENOUS EVERY 4 HOURS PRN
Status: DISPENSED | OUTPATIENT
Start: 2019-08-13 | End: 2019-08-14

## 2019-08-13 RX ORDER — PROCHLORPERAZINE EDISYLATE 5 MG/ML
5 INJECTION INTRAMUSCULAR; INTRAVENOUS ONCE AS NEEDED
Status: DISCONTINUED | OUTPATIENT
Start: 2019-08-13 | End: 2019-08-13 | Stop reason: HOSPADM

## 2019-08-13 RX ORDER — FAMOTIDINE 20 MG/1
20 TABLET ORAL ONCE
Status: DISCONTINUED | OUTPATIENT
Start: 2019-08-13 | End: 2019-08-13 | Stop reason: HOSPADM

## 2019-08-13 RX ORDER — METOPROLOL SUCCINATE 50 MG/1
50 TABLET, EXTENDED RELEASE ORAL NIGHTLY
Status: DISCONTINUED | OUTPATIENT
Start: 2019-08-13 | End: 2019-08-13

## 2019-08-13 RX ORDER — MORPHINE SULFATE 2 MG/ML
2 INJECTION, SOLUTION INTRAMUSCULAR; INTRAVENOUS EVERY 10 MIN PRN
Status: DISCONTINUED | OUTPATIENT
Start: 2019-08-13 | End: 2019-08-13 | Stop reason: HOSPADM

## 2019-08-13 RX ORDER — METOPROLOL SUCCINATE 50 MG/1
50 TABLET, EXTENDED RELEASE ORAL NIGHTLY
Status: DISCONTINUED | OUTPATIENT
Start: 2019-08-13 | End: 2019-08-15

## 2019-08-13 RX ORDER — SODIUM CHLORIDE, SODIUM LACTATE, POTASSIUM CHLORIDE, CALCIUM CHLORIDE 600; 310; 30; 20 MG/100ML; MG/100ML; MG/100ML; MG/100ML
INJECTION, SOLUTION INTRAVENOUS CONTINUOUS
Status: DISCONTINUED | OUTPATIENT
Start: 2019-08-13 | End: 2019-08-13 | Stop reason: HOSPADM

## 2019-08-13 RX ORDER — HYDROCODONE BITARTRATE AND ACETAMINOPHEN 5; 325 MG/1; MG/1
2 TABLET ORAL AS NEEDED
Status: DISCONTINUED | OUTPATIENT
Start: 2019-08-13 | End: 2019-08-13 | Stop reason: HOSPADM

## 2019-08-13 RX ORDER — DIPHENHYDRAMINE HCL 25 MG
25 CAPSULE ORAL EVERY 4 HOURS PRN
Status: DISCONTINUED | OUTPATIENT
Start: 2019-08-13 | End: 2019-08-15

## 2019-08-13 RX ADMIN — GLYCOPYRROLATE 0.2 MG: 0.2 INJECTION INTRAMUSCULAR; INTRAVENOUS at 13:43:00

## 2019-08-13 RX ADMIN — SODIUM CHLORIDE, SODIUM LACTATE, POTASSIUM CHLORIDE, CALCIUM CHLORIDE: 600; 310; 30; 20 INJECTION, SOLUTION INTRAVENOUS at 13:43:00

## 2019-08-13 RX ADMIN — DEXAMETHASONE SODIUM PHOSPHATE 4 MG: 4 MG/ML VIAL (ML) INJECTION at 13:43:00

## 2019-08-13 RX ADMIN — GLYCOPYRROLATE 0.4 MG: 0.2 INJECTION INTRAMUSCULAR; INTRAVENOUS at 14:25:00

## 2019-08-13 RX ADMIN — SODIUM CHLORIDE, SODIUM LACTATE, POTASSIUM CHLORIDE, CALCIUM CHLORIDE: 600; 310; 30; 20 INJECTION, SOLUTION INTRAVENOUS at 14:25:00

## 2019-08-13 RX ADMIN — ONDANSETRON 4 MG: 2 INJECTION INTRAMUSCULAR; INTRAVENOUS at 14:25:00

## 2019-08-13 RX ADMIN — NEOSTIGMINE METHYLSULFATE 3 MG: 0.5 INJECTION INTRAVENOUS at 14:25:00

## 2019-08-13 RX ADMIN — LIDOCAINE HYDROCHLORIDE 50 MG: 10 INJECTION, SOLUTION EPIDURAL; INFILTRATION; INTRACAUDAL; PERINEURAL at 13:43:00

## 2019-08-13 RX ADMIN — CEFAZOLIN SODIUM/WATER 2 G: 2 G/20 ML SYRINGE (ML) INTRAVENOUS at 14:03:00

## 2019-08-13 RX ADMIN — ROCURONIUM BROMIDE 40 MG: 10 INJECTION, SOLUTION INTRAVENOUS at 13:43:00

## 2019-08-13 RX ADMIN — MIDAZOLAM HYDROCHLORIDE 2 MG: 1 INJECTION INTRAMUSCULAR; INTRAVENOUS at 13:43:00

## 2019-08-13 NOTE — OPERATIVE REPORT
PATIENT  Mary Jo Georgia    DATE OF SURGERY  8/13/19    SURGEON   Elizabet Rasheed MD    ASSISTANT  Dee Deras MD R4    PREOPERATIVE DIAGNOSIS   Neurogenic claudication with bilateral lumbar radiculopathy with motor weakness due to grzegorz performed according to the [facility] standards identifying the appropriate patient, surgical site, and surgical procedure. Under fluoroscopy, the operative levels were identified. A midline incision was made after local anesthetic was injected into the tissue.

## 2019-08-13 NOTE — ANESTHESIA PREPROCEDURE EVALUATION
Anesthesia PreOp Note    HPI:     Keny Glasgow is a 69year old female who presents for preoperative consultation requested by: Kush Montes De Oca MD    Date of Surgery: 8/13/2019    Procedure(s):  LUMBAR LAMINECTOMY 1 LEVEL  Indication: stenosis with neur COLONOSCOPY  2015    @ [facility]   • EGD  2015    @ [facility]    • OTHER      REPAIR VAGINAL PROLAPSE   • OTHER      NECK AND UPPER BACK SURGERY AFTER ACCIDENT   • OTHER Right     FINGERS AMPUTATIONS   • TUBAL LIGATION           Medications Prior to Admission: MI   • Cancer Father 39        stomach   • Other (Other) Father         emphysema   • Other (Other) Mother         emphysema     Social History    Socioeconomic History      Marital status:       Spouse name: Not on file      Number of children: No Her blood pressure is 119/56 and her pulse is 48 (abnormal). Her respiration is 18 and oxygen saturation is 93%.     08/12/19  0951 08/13/19  0954   BP:  119/56   Pulse:  (!) 48   Resp:  18   Temp:  97.6 °F (36.4 °C)   TempSrc:  Oral   SpO2:  93%   Weight:

## 2019-08-13 NOTE — H&P
[facility]    Aris Joseph Patient Status:  Outpatient in a Bed    1940 MRN [id number]   Location [address] [facility] Attending Etta Olivarez MD   Hosp Day # 0 PCP Valdene Bence, DO     Active Problems:    * No

## 2019-08-13 NOTE — ANESTHESIA PROCEDURE NOTES
Airway  Urgency: elective    Airway not difficult    General Information and Staff    Patient location during procedure: OR  Anesthesiologist: Shalini Lara MD  Resident/CRNA: Benjamin Colindres CRNA  Performed: CRNA     Indications and Patie

## 2019-08-14 PROCEDURE — 99232 SBSQ HOSP IP/OBS MODERATE 35: CPT | Performed by: HOSPITALIST

## 2019-08-14 RX ORDER — SODIUM PHOSPHATE, DIBASIC AND SODIUM PHOSPHATE, MONOBASIC 7; 19 G/133ML; G/133ML
1 ENEMA RECTAL ONCE AS NEEDED
Status: DISCONTINUED | OUTPATIENT
Start: 2019-08-14 | End: 2019-08-15

## 2019-08-14 RX ORDER — BISACODYL 10 MG
10 SUPPOSITORY, RECTAL RECTAL
Status: DISCONTINUED | OUTPATIENT
Start: 2019-08-14 | End: 2019-08-15

## 2019-08-14 RX ORDER — DEXAMETHASONE SODIUM PHOSPHATE 4 MG/ML
8 VIAL (ML) INJECTION EVERY 6 HOURS
Status: COMPLETED | OUTPATIENT
Start: 2019-08-14 | End: 2019-08-14

## 2019-08-14 RX ORDER — NIFEDIPINE 90 MG/1
90 TABLET, EXTENDED RELEASE ORAL NIGHTLY
Status: DISCONTINUED | OUTPATIENT
Start: 2019-08-14 | End: 2019-08-15

## 2019-08-14 RX ORDER — POLYETHYLENE GLYCOL 3350 17 G/17G
17 POWDER, FOR SOLUTION ORAL DAILY PRN
Status: DISCONTINUED | OUTPATIENT
Start: 2019-08-14 | End: 2019-08-15

## 2019-08-14 RX ORDER — DOCUSATE SODIUM 100 MG/1
100 CAPSULE, LIQUID FILLED ORAL 2 TIMES DAILY
Status: DISCONTINUED | OUTPATIENT
Start: 2019-08-14 | End: 2019-08-15

## 2019-08-14 NOTE — PHYSICAL THERAPY NOTE
PHYSICAL THERAPY EVALUATION - INPATIENT     Room Number: 407/407-A  Evaluation Date: 8/14/2019  Type of Evaluation: Initial   Physician Order: PT Eval and Treat       Reason for Therapy: Mobility Dysfunction and Discharge Planning    PHYSICAL THERAPY ASSE 8/13/2019    Performed by Rasheed Jeffery MD at [facility] OR   • OTHER      REPAIR VAGINAL PROLAPSE   • OTHER      NECK AND UPPER BACK SURGERY AFTER ACCIDENT   • OTHER Right     FINGERS AMPUTATIONS   • TUBAL LIGATION         HOME SITUATION  Type of Home: Ho SBA    Exercise/Education Provided:  Bed mobility  Gait training  Transfer training    Patient End of Session: Up in chair    CURRENT GOALS    Goals to be met by: 8/20/19  Patient Goal Patient's self-stated goal is: to go home   Goal #1 Patient is able to

## 2019-08-14 NOTE — PAYOR COMM NOTE
--------------  ADMISSION REVIEW     Payor: Jemal Saunders 673 #:  [id number]  Authorization Number: [id number] / Reinier AUGUSTIN#[id number]    Admit date: 8/13/19  Admit time: 1632       Admitting Physician: Brenda Thompson MD  Attending Physician: Marlene Alva MD     ASSISTANT  Dee Scruggs MD R4     PREOPERATIVE DIAGNOSIS   Neurogenic claudication with bilateral lumbar radiculopathy with motor weakness due to severe spinal stenosis L5-S1 (Transitional level)     POSTOPERAT appropriate patient, surgical site, and surgical procedure. Under fluoroscopy, the operative levels were identified. A midline incision was made after local anesthetic was injected into the tissue.  Bovie cautery was used to subperiosteally dissect the mi mL Infiltration (Back) Emerson Miranda MD      ceFAZolin sodium (ANCEF/KEFZOL) 2 GM/20ML premix IV syringe 2 g     Date Action Dose Route User    8/14/2019 0559 Given 2 g Intravenous Samson Hanson RN    8/13/2019 2046 Given 2 g Intravenous Samson Hanson RN HCl (DILAUDID) 1 MG/ML injection 0.2 mg     Date Action Dose Route User    8/13/2019 1726 Given 0.2 mg Intravenous Almita Reid RN      losartan (COZAAR) tab 100 mg     Date Action Dose Route User    8/14/2019 0821 Given 100 mg Oral Sulaiman Vincent

## 2019-08-14 NOTE — PLAN OF CARE
Problem: PAIN - ADULT  Goal: Verbalizes/displays adequate comfort level or patient's stated pain goal  Description  INTERVENTIONS:  - Encourage pt to monitor pain and request assistance  - Assess pain using appropriate pain scale  - Administer analges Assess patient's ability to be responsible for managing their own health  - Refer to Case Management Department for coordinating discharge planning if the patient needs post-hospital services based on physician/LIP order or complex needs related to functio wounds/incisions during mobilization  - Obtain PT/OT consults as needed  - Advance activity as appropriate  - Communicate ordered activity level and limitations with patient/family  Outcome: Progressing  Goal: Maintain proper alignment of affected body par

## 2019-08-14 NOTE — PLAN OF CARE
This RN took over the pt at 1530. Pt continues to void with minimal residuals. Pain better managed today. Will monitor.

## 2019-08-15 VITALS
HEART RATE: 58 BPM | TEMPERATURE: 99 F | BODY MASS INDEX: 26.12 KG/M2 | HEIGHT: 64 IN | RESPIRATION RATE: 18 BRPM | OXYGEN SATURATION: 94 % | WEIGHT: 153 LBS | DIASTOLIC BLOOD PRESSURE: 57 MMHG | SYSTOLIC BLOOD PRESSURE: 140 MMHG

## 2019-08-15 PROCEDURE — 99239 HOSP IP/OBS DSCHRG MGMT >30: CPT | Performed by: HOSPITALIST

## 2019-08-15 RX ORDER — HYDROCODONE BITARTRATE AND ACETAMINOPHEN 5; 325 MG/1; MG/1
1 TABLET ORAL EVERY 4 HOURS PRN
Qty: 30 TABLET | Refills: 0 | Status: SHIPPED | OUTPATIENT
Start: 2019-08-15

## 2019-08-15 RX ORDER — CYCLOBENZAPRINE HCL 5 MG
5 TABLET ORAL 3 TIMES DAILY PRN
Qty: 30 TABLET | Refills: 0 | Status: SHIPPED | OUTPATIENT
Start: 2019-08-15

## 2019-08-15 RX ORDER — CYCLOBENZAPRINE HCL 5 MG
5 TABLET ORAL 3 TIMES DAILY PRN
Qty: 30 TABLET | Refills: 0 | Status: SHIPPED | OUTPATIENT
Start: 2019-08-15 | End: 2019-08-15

## 2019-08-15 NOTE — PLAN OF CARE
Problem: Patient/Family Goals  Goal: Patient/Family Long Term Goal  Description  Patient's Long Term Goal: To have my pain controlled    Interventions:  - Pain medications vs non- pharmacological inteventions  - See additional Care Plan goals for specifi fall injury  Description  INTERVENTIONS:  - Assess pt frequently for physical needs  - Identify cognitive and physical deficits and behaviors that affect risk of falls.   - [place] fall precautions as indicated by assessment.  - Educate pt/family on patie Skin integrity remains intact  Description  INTERVENTIONS  - Assess and document risk factors for pressure ulcer development  - Assess and document skin integrity  - Monitor for areas of redness and/or skin breakdown  - Initiate interventions, skin care al feeding, grooming, and bathing  - Educate and encourage patient/family in tolerated functional activity level and precautions during self-care  - Encourage patient to incorporate impaired side during daily activities to promote function  Outcome: Progressi

## 2019-08-15 NOTE — PROGRESS NOTES
SPINE ATTENDING NOTE     Had difficulty with voiding earlier today. Able to void with less than 200 left. Patient doing well. Able to ambulate. Spoke with nurse over the phone. Rectal tone and sensation intact earlier today. No changes per patient.      Ass

## 2019-08-15 NOTE — PLAN OF CARE
Pt is POD #1-2. BP elevated; PRN hydralazine given. Will continue to monitor. Other vital signs stable. Tele #65 in place. On room air. Tolerating general diet. Voids frely. Dressing clean, dry, and intact. Up with one assist and the walker.  SCDs for DVT p resources  Description  INTERVENTIONS:  - Identify barriers to discharge w/pt and caregiver  - Include patient/family/discharge partner in discharge planning  - Arrange for needed discharge resources and transportation as appropriate  - Identify discharge precautions as appropriate  - Initiate Pressure Ulcer prevention bundle as indicated  Outcome: Progressing     Problem: MUSCULOSKELETAL - ADULT  Goal: Return mobility to safest level of function  Description  INTERVENTIONS:  - Assess patient stability and

## 2019-08-15 NOTE — CM/SW NOTE
SW met with the patient at bedside. The patient lives alone  in 1 level house with 2 steps to enter . The patient responds being independent prior to admission with all ADL's, ambulation and driving. Patient denies use of any DME.      Patient would like to

## 2019-08-15 NOTE — DISCHARGE SUMMARY
[facility] HOSPITALIST  DISCHARGE SUMMARY     Tamar Campos Patient Status:  Inpatient    1940 MRN [id number]   Location [facility] 4W/SW/SE Attending Dalton Porras MD   [facility] Day # 2 PCP Morena Aaron DO     DATE OF ADMISSION: 2019 rales, rhonchi. Abd: Soft, NTND, BS normal, no mass, no HSM, no rebound/guarding. Neuro: Normal reflexes, CN. Sensory/motor exams grossly normal deficit. Coordination  and gait normal.   MS: No joint effusions. No peripheral edema.   Incision clean dry Refills:  0     simvastatin 40 MG Tabs  Commonly known as:  ZOCOR      Take 0.5 tablets (20 mg total) by mouth nightly.    Quantity:  45 tablet  Refills:  1           Where to Get Your Medications      Please  your prescriptions at the location [facility]

## 2019-08-15 NOTE — PROGRESS NOTES
SPINE ATTENDING NOTE     No acute distress  Alert and oriented  Retention this morning.  Patient had gill placed without void trial.   Preoperative pain resolved      08/15/19  0941   BP: 133/55   Pulse:    Resp: 16   Temp: 98.5 °F (36.9 °C)      Alert and

## 2019-08-15 NOTE — PROGRESS NOTES
[facility]  Hospitalist Progress Note     Pastor Quan Patient Status:  Inpatient    1940  69year old [facility] [id number]   Location 407/407-A Attending Cathie Puga MD   Hosp Day # 2 PCP Abram Garcia,      ASSESSMENT/PLAN    Ne effusions. No peripheral edema. Incision clean dry and intact  Skin: Skin is warm and dry. No rashes, erythema, diaphoresis. Psych: Normal mood and affect.  Calm, cooperative    Labs:  No results for input(s): RBC, HGB, HCT, MCV, MCH, MCHC, RDW, NEPRELI

## 2019-08-15 NOTE — PROGRESS NOTES
SPINE ATTENDING NOTE     No acute distress  Alert and oriented  Retention yesterday evening. Unable to void. Has chronic urinary incontinence. Denies any radiating pain into her legs. Complains of low back pain. No weakness in her legs.  Able to ambulate ye

## 2019-08-16 NOTE — PAYOR COMM NOTE
--------------  DISCHARGE REVIEW    Payor: Michelle Goetz [place]:  [id number]  Authorization Number: [id number] / Tori MACIAS#[id number]    Admit date: 8/13/19  Admit time:  1632  Discharge Date: 8/15/2019  3:43 PM     Admitting Physician: Gallito uGo baseline poor control as well as increased pain. Improved by the time of discharge.     Patient understands return to the emergency room for increased pain, fever, discharge, shortness of breath, chest pain, new neurologic symptoms, other concerning sympto daily.   Quantity:  90 tablet  Refills:  0     CITRACAL + D OR      Take 2 tablets by mouth 2 (two) times daily. Refills:  0     Irbesartan 300 MG Tabs      Take 1 tablet (300 mg total) by mouth once daily.    Quantity:  90 tablet  Refills:  0     Metopro

## 2019-08-16 NOTE — PAYOR COMM NOTE
--------------  CONTINUED STAY REVIEW    Payor: Cynthia Woodruff  Subscriber [place]:  [id number]  Authorization Number: [id number] / Ileana Merlin #[id number]    Admit date: 8/13/19  Admit time: 1632    Admitting Physician: Marycruz Calloway MD  Attending Physician: pressures. Current increase in blood pressure likely due to her pain. Would not want to overcorrect as her pain will improve and she will bottom   -Continue present management  -PRN IV hydralazine    She does not feel ready to go home today.     Temp:  [9 User    Discharged on 8/15/2019    8/15/2019 0900 Given 100 mg Oral Roya De León RN      HYDROcodone-acetaminophen [facility]) 5-325 MG per tab 1 tablet     Date Action Dose Route User    Discharged on 8/15/2019    8/15/2019 1522 Given 1 tablet Oral Seamus Sweeney

## 2019-10-17 NOTE — PROGRESS NOTES
[facility]    Progress Note    Pati Nguyen Patient Status:  Outpatient in a Bed    1940 MRN [id number]   Location [address] [facility] Attending Seamus Venegas MD   Hosp Day # 0 PCP Paola Mathews DO MEDS,MONITOR.        Hyperlipidemia              Results:     Lab Results   Component Value Date    WBC 5.6 07/29/2017    HGB 13.8 07/29/2017    HCT 41.4 07/29/2017     07/29/2017    CREATSERUM 1.24 07/29/2017    BUN 28 (H) 07/29/2017     07/29 Have Your Skin Lesions Been Treated?: not been treated Is This A New Presentation, Or A Follow-Up?: Skin Lesions How Severe Is Your Skin Lesion?: mild

## 2019-11-29 NOTE — PHYSICAL THERAPY NOTE
PHYSICAL THERAPY TREATMENT NOTE - INPATIENT     Room Number: 407/407-A            Problem List  Principal Problem:    Neurogenic claudication due to lumbar spinal stenosis  Active Problems:    Essential hypertension    Hyperlipidemia      PHYSICAL THERAPY adjusting bedclothes, sheets and blankets)?: A Little   -   Sitting down on and standing up from a chair with arms (e.g., wheelchair, bedside commode, etc.): None   -   Moving from lying on back to sitting on the side of the bed?: A Little   How much help in preparation for discharge.    Goal #5   Current Status In progress   Goal #6    Goal #6  Current Status Alert/Awake

## 2023-03-01 ENCOUNTER — PATIENT OUTREACH (OUTPATIENT)
Dept: CASE MANAGEMENT | Age: 83
End: 2023-03-01

## 2023-03-01 NOTE — PROCEDURES
The office order for PCP removal request is Approved and finalized on March 1, 2023.     Thanks,  [facility] [employer]

## 2023-07-01 NOTE — PATIENT INSTRUCTIONS
1. Continue budesonide 3mg - take every other day  2. Blood test - first thing in the AM  3. Okay to use imodium and bismuth (pepto-bismol) alternating to help with microscopic colitis   4.  VAUGHN to get records of EGD, colonoscopy and path from [facility]
100

## 2024-10-09 NOTE — PROGRESS NOTES
[facility]  Hospitalist Progress Note     Noemí Gallego Patient Status:  Inpatient    1940  69year old [facility] [id number]   Location 407/407-A Attending Bennie Iglesias MD   Hosp Day # 1 PCP Elias Browning DO     ASSESSMENT/PLAN    Ne clean dry and intact  Skin: Skin is warm and dry. No rashes, erythema, diaphoresis. Psych: Normal mood and affect. Calm, cooperative    Labs:  No results for input(s): RBC, HGB, HCT, MCV, MCH, MCHC, RDW, NEPRELIM, WBC, PLT in the last 168 hours.   No resu no

## (undated) DEVICE — GAMMEX® PI HYBRID SIZE 8.5, STERILE POWDER-FREE SURGICAL GLOVE, POLYISOPRENE AND NEOPRENE BLEND: Brand: GAMMEX

## (undated) DEVICE — PAD THRP WRPON PLR LNG STRAP

## (undated) DEVICE — NON-ADHERENT DRESSING: Brand: TELFA

## (undated) DEVICE — SUTURE VICRYL 2-0 CT-1

## (undated) DEVICE — POLAR CARE CUBE COOLING UNIT

## (undated) DEVICE — 3.0MM PRECISION NEURO (MATCH HEAD)

## (undated) DEVICE — WRAP THRP PLRCR500 BCK DRSG

## (undated) DEVICE — GAUZE SPONGES,12 PLY: Brand: CURITY

## (undated) DEVICE — 3M™ TEGADERM™ TRANSPARENT FILM DRESSING, 1626W, 4 IN X 4-3/4 IN (10 CM X 12 CM), 50 EACH/CARTON, 4 CARTON/CASE: Brand: 3M™ TEGADERM™

## (undated) DEVICE — 3M™ STERI-DRAPE™ U-DRAPE 1015: Brand: STERI-DRAPE™

## (undated) DEVICE — PEN: MARKING STD PT 100/CS: Brand: MEDICAL ACTION INDUSTRIES

## (undated) DEVICE — FORCEP CUSH BAY BP DISP 7.5IN

## (undated) DEVICE — DRAPE SHEET LG

## (undated) DEVICE — SUTURE ETHILON 3-0 669H

## (undated) DEVICE — KIT DRN 1/8IN PVC 3 SPRG EVAC

## (undated) DEVICE — KIT PATIENT CARE SPINAL TABLE

## (undated) DEVICE — FLOSEAL SEALENT STERILE 10ML

## (undated) DEVICE — SUTURE MONOCRYL 3-0 Y936H

## (undated) DEVICE — NON-ADHERENT PADS PREPACK: Brand: TELFA

## (undated) DEVICE — UNDYED BRAIDED (POLYGLACTIN 910), SYNTHETIC ABSORBABLE SUTURE: Brand: COATED VICRYL

## (undated) DEVICE — LAMINECTOMY: Brand: MEDLINE INDUSTRIES, INC.

## (undated) NOTE — MR AVS SNAPSHOT
[facility] - [facility] DIV[facility]  [address], [address]  [phone]               Thank you for choosing us for your health care visit with Sharon Strong MD.  We are glad to serve you and happy to provide you with this summary clinic staff will provide you with the phone number you should call to schedule your appointment.      If you are confident that your benefit plan will not require a referral or authorization, such as [place], please feel free to schedule your arely Today's Vital Signs     BP Pulse Temp Weight          190/83 mmHg 62 98.2 °F (36.8 °C) (Oral) 149 lb (67.586 kg)           Current Medications          This list is accurate as of: 5/31/17 11:59 PM.  Always use your most recent med list.                Bud

## (undated) NOTE — MR AVS SNAPSHOT
[address]  [address]  [phone]               Thank you for choosing us for your health care visit with Terese May DPM.  We are glad to serve you and happy to provide yo Take 0.5 tablets (20 mg total) by mouth nightly. Commonly known as:  ZOCOR           TiZANidine HCl 2 MG Tabs   Take 1 tablet (2 mg total) by mouth nightly.    Commonly known as:  ZANAFLEX           TraMADol HCl 50 MG Tabs   TK 1 T PO  Q 6 H PRN   Commonl

## (undated) NOTE — MR AVS SNAPSHOT
[address]  [address]  [phone]               Thank you for choosing us for your health care visit with Sofia Ibarra DPM.  We are glad to serve you and happy to provide yo Take by mouth. omeprazole 20 MG Cpdr   TK 1 C PO QD   Commonly known as:  PRILOSEC           PROBIOTIC DAILY OR   Take by mouth. simvastatin 40 MG Tabs   Take 0.5 tablets (20 mg total) by mouth nightly.    Commonly known as:  ZOCOR

## (undated) NOTE — MR AVS SNAPSHOT
[facility] - [facility] DIV[facility]  [address], [address]  [phone]               Thank you for choosing us for your health care visit with Katharina Lundy MD.  We are glad to serve you and happy to provide you with this summary TK 1 T PO QD           LUTEIN-ZEAXANTHIN OR   Take by mouth. NIFEDICAL XL 60 MG Tb24   Generic drug:  NIFEdipine ER Osmotic Release   TK 1 T PO QD           Omega 3 1000 MG Caps   Take by mouth.            omeprazole 20 MG Cpdr   TK 1 C PO QD   Co Get your heart pumping – brisk walking, biking, swimming Even 10 minute increments are effective and add up over the week   2 ½ hours per week – spread out over time Use a seema to keep you motivated   Don’t forget strength training with weights and resist

## (undated) NOTE — MR AVS SNAPSHOT
[facility] - [facility] DIVISION  [address], [address]  [phone]               Thank you for choosing us for your health care visit with Wali Roberts MD.  We are glad to serve you and happy to provide you with this summary TiZANidine HCl 2 MG Tabs   Take 1 tablet (2 mg total) by mouth nightly. Commonly known as:  ZANAFLEX           TraMADol HCl 50 MG Tabs   TK 1 T PO  Q 6 H PRN   What changed:  See the new instructions.    Commonly known as:  ULTRAM                Where to schedule your appointment. If you are confident that your benefit plan will not require a referral or authorization, such as [place], please feel free to schedule your appointment immediately.  However, if you are unsure about the requirements

## (undated) NOTE — Clinical Note
AUTHORIZATION FOR SURGICAL OPERATION OR OTHER PROCEDURE    1.  I hereby authorize Dr. Matt Triana, and [facility] staff assigned to my case to perform the following operation and/or procedure at the [facility]:    _________________________ Patient Name:  ______________________________________________________  (please print)      Patient signature:  ___________________________________________________             Relationship to Patient:             Parent    Responsible person